# Patient Record
Sex: MALE | Race: WHITE | NOT HISPANIC OR LATINO | Employment: FULL TIME | URBAN - METROPOLITAN AREA
[De-identification: names, ages, dates, MRNs, and addresses within clinical notes are randomized per-mention and may not be internally consistent; named-entity substitution may affect disease eponyms.]

---
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
## 2021-11-05 ENCOUNTER — APPOINTMENT (OUTPATIENT)
Dept: GENERAL RADIOLOGY | Facility: HOSPITAL | Age: 21
End: 2021-11-05

## 2021-11-05 ENCOUNTER — HOSPITAL ENCOUNTER (EMERGENCY)
Facility: HOSPITAL | Age: 21
Discharge: HOME | End: 2021-11-05
Attending: EMERGENCY MEDICINE | Admitting: EMERGENCY MEDICINE

## 2021-11-05 VITALS
OXYGEN SATURATION: 98 % | SYSTOLIC BLOOD PRESSURE: 130 MMHG | HEART RATE: 65 BPM | DIASTOLIC BLOOD PRESSURE: 84 MMHG | WEIGHT: 240 LBS | BODY MASS INDEX: 31.81 KG/M2 | TEMPERATURE: 98.3 F | HEIGHT: 73 IN | RESPIRATION RATE: 16 BRPM

## 2021-11-05 DIAGNOSIS — R05.9 COUGH: Primary | ICD-10-CM

## 2021-11-05 LAB — S PYO AG THROAT QL: NEGATIVE

## 2021-11-05 PROCEDURE — 87081 CULTURE SCREEN ONLY: CPT

## 2021-11-05 PROCEDURE — 87880 STREP A ASSAY W/OPTIC: CPT

## 2021-11-05 PROCEDURE — 71045 X-RAY EXAM CHEST 1 VIEW: CPT

## 2021-11-05 PROCEDURE — 99283 EMERGENCY DEPT VISIT LOW MDM: CPT

## 2021-11-05 PROCEDURE — 87147 CULTURE TYPE IMMUNOLOGIC: CPT

## 2021-11-05 PROCEDURE — 99282 EMERGENCY DEPT VISIT SF MDM: CPT

## 2021-11-06 NOTE — ED NOTES
Patient arrives to ER with c/o cough and sore throat. Relates that he started coughing yesterday and woke up this morning with a sore throat. Relates he started coughing up yellow mucous this am. Throat appears red upon assessment.     Windy Acosta, RN  11/05/21 1416

## 2021-11-06 NOTE — ED PROVIDER NOTES
" EMERGENCY DEPARTMENT ENCOUNTER      Room Number: 12/12    History is provided by the patient, no translation services needed    HPI:    Chief complaint: Cough, sore throat    Quality/Severity: Sore throat 5/10 severity    Timing/Duration: 2 days    Modifying Factors: Has had relief with Tylenol, ibuprofen, cough drops.    Associated Symptoms: Headache.  Denies congestion, runny nose, sinus pressure, ear pain.    Narrative: Pt is a 21 y.o. male who presents complaining of a cough and sore throat that began yesterday and has progressively worsened.  Patient has tried Tylenol, ibuprofen, cough drops, which have not provided much relief.  Patient has not tried other OTC medications.  Patient has not been vaccinated against Covid. Previous 1.5 pack/day smoker.  Now uses \"a lot\" of nicotine vaping.  No known history of lung disease.  History of strep infections.    PMD: Provider, No Known    REVIEW OF SYSTEMS  Review of Systems   Constitutional: Negative for chills and fever.   HENT: Positive for sore throat. Negative for congestion, postnasal drip, rhinorrhea, sinus pressure and sinus pain.    Eyes: Negative for pain and visual disturbance.   Respiratory: Positive for cough. Negative for shortness of breath.    Cardiovascular: Negative for chest pain and palpitations.   Gastrointestinal: Negative for abdominal pain, constipation, diarrhea, nausea and vomiting.   Genitourinary: Negative for difficulty urinating, dysuria and flank pain.   Musculoskeletal: Negative for gait problem and myalgias.   Skin: Negative for rash and wound.   Neurological: Positive for headaches. Negative for dizziness, syncope and numbness.       PAST MEDICAL HISTORY  Active Ambulatory Problems     Diagnosis Date Noted   • No Active Ambulatory Problems     Resolved Ambulatory Problems     Diagnosis Date Noted   • No Resolved Ambulatory Problems     No Additional Past Medical History       PAST SURGICAL HISTORY  History reviewed. No pertinent " surgical history.    FAMILY HISTORY  History reviewed. No pertinent family history.    SOCIAL HISTORY  Social History     Socioeconomic History   • Marital status: Single   Tobacco Use   • Smoking status: Current Every Day Smoker     Packs/day: 1.00     Types: Cigarettes   Vaping Use   • Vaping Use: Every day   Substance and Sexual Activity   • Alcohol use: Not Currently       ALLERGIES  Patient has no known allergies.    No current facility-administered medications for this encounter.  No current outpatient medications on file.    PHYSICAL EXAM  ED Triage Vitals   Temp Pulse Resp BP SpO2   -- -- -- -- --      Temp src Heart Rate Source Patient Position BP Location FiO2 (%)   -- -- -- -- --       Physical Exam  Constitutional:       General: He is not in acute distress.     Appearance: He is normal weight. He is not ill-appearing.   HENT:      Head: Normocephalic and atraumatic.      Right Ear: Tympanic membrane, ear canal and external ear normal.      Left Ear: Tympanic membrane, ear canal and external ear normal.      Nose: No congestion or rhinorrhea.      Mouth/Throat:      Pharynx: Posterior oropharyngeal erythema present. No oropharyngeal exudate.      Tonsils: No tonsillar exudate or tonsillar abscesses.      Comments: Tonsils appear enlarged.  Eyes:      Conjunctiva/sclera: Conjunctivae normal.      Pupils: Pupils are equal, round, and reactive to light.   Cardiovascular:      Rate and Rhythm: Normal rate and regular rhythm.      Heart sounds: Normal heart sounds.   Pulmonary:      Effort: Pulmonary effort is normal.      Breath sounds: Normal breath sounds.   Musculoskeletal:      Cervical back: Normal range of motion.   Skin:     General: Skin is warm and dry.      Capillary Refill: Capillary refill takes less than 2 seconds.   Neurological:      Mental Status: He is alert.   Psychiatric:         Mood and Affect: Mood normal.         Behavior: Behavior normal.       LAB RESULTS  Lab Results (last 24 hours)      Procedure Component Value Units Date/Time    Rapid Strep A Screen - Swab, Throat [[id number]]  (Normal) Collected: 11/05/21 2129    Specimen: Swab from Throat Updated: 11/05/21 2151     Strep A Ag Negative    Beta Strep Culture, Throat - Swab, Throat [[id number]] Collected: 11/05/21 2129    Specimen: Swab from Throat Updated: 11/05/21 2150        I ordered the above labs and reviewed the results    RADIOLOGY  XR Chest 1 View    Result Date: 11/5/2021  CR Chest 1 Vw INDICATION: Cough, smoker COMPARISON:  None available. FINDINGS: Portable AP view(s) of the chest.  The heart and mediastinal contours are normal. The lungs are clear. No pneumothorax or pleural effusion.     No acute cardiopulmonary findings. Signer Name: Maribel Stern MD  Signed: 11/5/2021 10:10 PM  Workstation Name: [id number]  [facility]    I ordered the above radiologic testing and reviewed the results    PROCEDURES  Procedures      PROGRESS AND CONSULTS  ED Course as of 11/05/21 2236 Fri Nov 05, 2021 2200 Patient has not been vaccinated for COVID-19, but discussed with patient his symptoms are nonconcerning for infection and will forego Covid swab at this time. [AS]   2223 Discussed with patient's results of chest x-ray and rapid strep test.  Patient verbalized understanding that antibiotics would not be needed, and that symptomatic treatment should resolve his symptoms.  Suggested follow-up with PCP if symptoms do not resolve or worsen.  Discussed smoking/vaping cessation.  Updated patient that beta strep culture would not be back before discharge. [AS]      ED Course User Index  [AS] Julisa Jiménez PA-C       MEDICAL DECISION MAKING    MDM     My differential diagnosis for cough includes but is not limited to:  Upper respiratory infection, bronchitis, pneumonia, cough variant asthma, bacterial, viral or lung infections, lung cancer, covid -19     DIAGNOSIS  Final diagnoses:   Cough       Latest Documented Vital Signs:  As  of 22:36 EDT  BP- 130/84 HR- 65 Temp- 98.3 °F (36.8 °C) (Oral) O2 sat- 98%    DISPOSITION  Discharged home.    Smoking cessation discussed with patient.    Discussed pertinent findings with the patient/family.  Patient/Family voiced understanding of need to follow-up for recheck and further testing as needed.  Return to the Emergency Department warnings were given.         Medication List      No changes were made to your prescriptions during this visit.              Follow-up Information     Provider, No Known.    Why: As needed, If symptoms worsen  Contact information:  [facility] [address] [phone]                           Dictated utilizing Dragon dictation     Julisa Jiménez PA-C  11/05/21 9334

## 2021-11-06 NOTE — DISCHARGE INSTRUCTIONS
Use OTC medications to control cough and mucus, such as Mucinex, Robitussin, etc.  Use OTC Tylenol and ibuprofen as needed for headache and sore throat.

## 2021-11-07 LAB — BACTERIA SPEC AEROBE CULT: ABNORMAL

## 2022-04-25 ENCOUNTER — HOSPITAL ENCOUNTER (EMERGENCY)
Facility: HOSPITAL | Age: 22
Discharge: HOME | End: 2022-04-25
Attending: EMERGENCY MEDICINE | Admitting: EMERGENCY MEDICINE

## 2022-04-25 VITALS
HEART RATE: 85 BPM | HEIGHT: 73 IN | TEMPERATURE: 98.5 F | OXYGEN SATURATION: 97 % | BODY MASS INDEX: 32.47 KG/M2 | SYSTOLIC BLOOD PRESSURE: 128 MMHG | RESPIRATION RATE: 18 BRPM | WEIGHT: 245 LBS | DIASTOLIC BLOOD PRESSURE: 74 MMHG

## 2022-04-25 DIAGNOSIS — K52.9 ACUTE GASTROENTERITIS: Primary | ICD-10-CM

## 2022-04-25 LAB
ALBUMIN SERPL-MCNC: 4.5 G/DL (ref 3.5–5.2)
ALBUMIN/GLOB SERPL: 2.1 G/DL
ALP SERPL-CCNC: 47 U/L (ref 39–117)
ALT SERPL W P-5'-P-CCNC: 17 U/L (ref 1–41)
ANION GAP SERPL CALCULATED.3IONS-SCNC: 9.6 MMOL/L (ref 5–15)
AST SERPL-CCNC: 17 U/L (ref 1–40)
BASOPHILS # BLD AUTO: 0.04 10*3/MM3 (ref 0–0.2)
BASOPHILS NFR BLD AUTO: 0.6 % (ref 0–1.5)
BILIRUB SERPL-MCNC: 0.5 MG/DL (ref 0–1.2)
BILIRUB UR QL STRIP: NEGATIVE
BUN SERPL-MCNC: 15 MG/DL (ref 6–20)
BUN/CREAT SERPL: 15.3 (ref 7–25)
CALCIUM SPEC-SCNC: 9 MG/DL (ref 8.6–10.5)
CHLORIDE SERPL-SCNC: 103 MMOL/L (ref 98–107)
CLARITY UR: ABNORMAL
CO2 SERPL-SCNC: 23.4 MMOL/L (ref 22–29)
COLOR UR: YELLOW
CREAT SERPL-MCNC: 0.98 MG/DL (ref 0.76–1.27)
DEPRECATED RDW RBC AUTO: 38.5 FL (ref 37–54)
EGFRCR SERPLBLD CKD-EPI 2021: 112.5 ML/MIN/1.73
EOSINOPHIL # BLD AUTO: 0.12 10*3/MM3 (ref 0–0.4)
EOSINOPHIL NFR BLD AUTO: 1.7 % (ref 0.3–6.2)
ERYTHROCYTE [DISTWIDTH] IN BLOOD BY AUTOMATED COUNT: 12 % (ref 12.3–15.4)
GLOBULIN UR ELPH-MCNC: 2.1 GM/DL
GLUCOSE SERPL-MCNC: 96 MG/DL (ref 65–99)
GLUCOSE UR STRIP-MCNC: NEGATIVE MG/DL
HCT VFR BLD AUTO: 47.8 % (ref 37.5–51)
HGB BLD-MCNC: 16.1 G/DL (ref 13–17.7)
HGB UR QL STRIP.AUTO: NEGATIVE
IMM GRANULOCYTES # BLD AUTO: 0.01 10*3/MM3 (ref 0–0.05)
IMM GRANULOCYTES NFR BLD AUTO: 0.1 % (ref 0–0.5)
KETONES UR QL STRIP: NEGATIVE
LEUKOCYTE ESTERASE UR QL STRIP.AUTO: NEGATIVE
LIPASE SERPL-CCNC: 19 U/L (ref 13–60)
LYMPHOCYTES # BLD AUTO: 2.84 10*3/MM3 (ref 0.7–3.1)
LYMPHOCYTES NFR BLD AUTO: 40.6 % (ref 19.6–45.3)
MCH RBC QN AUTO: 29.5 PG (ref 26.6–33)
MCHC RBC AUTO-ENTMCNC: 33.7 G/DL (ref 31.5–35.7)
MCV RBC AUTO: 87.7 FL (ref 79–97)
MONOCYTES # BLD AUTO: 0.6 10*3/MM3 (ref 0.1–0.9)
MONOCYTES NFR BLD AUTO: 8.6 % (ref 5–12)
NEUTROPHILS NFR BLD AUTO: 3.38 10*3/MM3 (ref 1.7–7)
NEUTROPHILS NFR BLD AUTO: 48.4 % (ref 42.7–76)
NITRITE UR QL STRIP: NEGATIVE
NRBC BLD AUTO-RTO: 0 /100 WBC (ref 0–0.2)
PH UR STRIP.AUTO: 8.5 [PH] (ref 4.5–8)
PLAT MORPH BLD: NORMAL
PLATELET # BLD AUTO: 161 10*3/MM3 (ref 140–450)
PMV BLD AUTO: 11.3 FL (ref 6–12)
POTASSIUM SERPL-SCNC: 3.7 MMOL/L (ref 3.5–5.2)
PROT SERPL-MCNC: 6.6 G/DL (ref 6–8.5)
PROT UR QL STRIP: NEGATIVE
RBC # BLD AUTO: 5.45 10*6/MM3 (ref 4.14–5.8)
RBC MORPH BLD: NORMAL
SODIUM SERPL-SCNC: 136 MMOL/L (ref 136–145)
SP GR UR STRIP: 1.01 (ref 1–1.03)
UROBILINOGEN UR QL STRIP: ABNORMAL
WBC MORPH BLD: NORMAL
WBC NRBC COR # BLD: 6.99 10*3/MM3 (ref 3.4–10.8)

## 2022-04-25 PROCEDURE — 99282 EMERGENCY DEPT VISIT SF MDM: CPT | Performed by: EMERGENCY MEDICINE

## 2022-04-25 PROCEDURE — 85025 COMPLETE CBC W/AUTO DIFF WBC: CPT | Performed by: EMERGENCY MEDICINE

## 2022-04-25 PROCEDURE — 25010000002 ONDANSETRON PER 1 MG: Performed by: EMERGENCY MEDICINE

## 2022-04-25 PROCEDURE — 81003 URINALYSIS AUTO W/O SCOPE: CPT | Performed by: EMERGENCY MEDICINE

## 2022-04-25 PROCEDURE — 96375 TX/PRO/DX INJ NEW DRUG ADDON: CPT

## 2022-04-25 PROCEDURE — 99283 EMERGENCY DEPT VISIT LOW MDM: CPT

## 2022-04-25 PROCEDURE — 80053 COMPREHEN METABOLIC PANEL: CPT | Performed by: EMERGENCY MEDICINE

## 2022-04-25 PROCEDURE — 85007 BL SMEAR W/DIFF WBC COUNT: CPT | Performed by: EMERGENCY MEDICINE

## 2022-04-25 PROCEDURE — 83690 ASSAY OF LIPASE: CPT | Performed by: EMERGENCY MEDICINE

## 2022-04-25 PROCEDURE — 96374 THER/PROPH/DIAG INJ IV PUSH: CPT

## 2022-04-25 RX ORDER — ONDANSETRON 2 MG/ML
4 INJECTION INTRAMUSCULAR; INTRAVENOUS ONCE
Status: COMPLETED | OUTPATIENT
Start: 2022-04-25 | End: 2022-04-25

## 2022-04-25 RX ORDER — FAMOTIDINE 10 MG/ML
20 INJECTION, SOLUTION INTRAVENOUS ONCE
Status: COMPLETED | OUTPATIENT
Start: 2022-04-25 | End: 2022-04-25

## 2022-04-25 RX ORDER — ONDANSETRON 8 MG/1
8 TABLET, ORALLY DISINTEGRATING ORAL EVERY 8 HOURS PRN
Qty: 10 TABLET | Refills: 0 | Status: SHIPPED | OUTPATIENT
Start: 2022-04-25

## 2022-04-25 RX ADMIN — FAMOTIDINE 20 MG: 10 INJECTION INTRAVENOUS at 14:43

## 2022-04-25 RX ADMIN — ONDANSETRON 4 MG: 2 INJECTION INTRAMUSCULAR; INTRAVENOUS at 14:43

## 2022-04-25 RX ADMIN — SODIUM CHLORIDE 1000 ML: 9 INJECTION, SOLUTION INTRAVENOUS at 14:37

## 2022-04-25 NOTE — ED PROVIDER NOTES
Subjective   History of Present Illness  History of Present Illness    Chief complaint: Vomiting and diarrhea    Location: Home    Quality/Severity: Nonbloody, nonbilious multiple episodes    Timing/Onset/Duration: Started about 3 days ago    Modifying Factors: Worse with food and drink    Associated Symptoms: No headache.  No fever chills or cough.  No sore throat earache or nasal congestion.  No chest pain or shortness of breath.  No blood in the urine or emesis.  Patient complains of some epigastric abdominal pain.    Narrative: This 22-year-old white male presents with vomiting and diarrhea and epigastric abdominal pain.  She does not drink alcohol.    PCP: No known provider  Review of Systems   Constitutional: Negative for chills and fever.   HENT: Positive for congestion. Negative for sore throat.    Respiratory: Negative for shortness of breath.    Gastrointestinal: Positive for abdominal pain, diarrhea, nausea and vomiting.   Genitourinary: Negative for difficulty urinating.   Musculoskeletal: Negative for back pain.   Skin: Negative for rash.   Psychiatric/Behavioral: Negative for confusion.        Medication List      You have not been prescribed any medications.         History reviewed. No pertinent past medical history.    No Known Allergies    History reviewed. No pertinent surgical history.    History reviewed. No pertinent family history.    Social History     Socioeconomic History   • Marital status: Single   Tobacco Use   • Smoking status: Current Every Day Smoker     Packs/day: 1.00     Types: Cigarettes   • Smokeless tobacco: Never Used   Vaping Use   • Vaping Use: Every day   Substance and Sexual Activity   • Alcohol use: Not Currently   • Drug use: Never   • Sexual activity: Defer           Objective   Physical Exam  Vitals (The temperature is 98.5 °F, pulse 85, respirations 17, /81, room pulse ox 98%.) and nursing note reviewed.   Constitutional:       Appearance: Normal appearance.    HENT:      Head: Normocephalic and atraumatic.      Mouth/Throat:      Mouth: Mucous membranes are moist.   Cardiovascular:      Rate and Rhythm: Normal rate and regular rhythm.      Pulses: Normal pulses.      Heart sounds: Normal heart sounds. No murmur heard.    No friction rub. No gallop.   Pulmonary:      Effort: Pulmonary effort is normal.      Breath sounds: Normal breath sounds.   Abdominal:      General: Abdomen is flat. Bowel sounds are normal. There is no distension.      Palpations: There is no mass.      Tenderness: There is abdominal tenderness (Mild epigastric tenderness). There is no guarding or rebound.      Hernia: No hernia is present.   Musculoskeletal:         General: No swelling or tenderness. Normal range of motion.   Skin:     General: Skin is warm and dry.      Findings: No rash.   Neurological:      General: No focal deficit present.      Mental Status: He is alert and oriented to person, place, and time.         Procedures           ED Course  ED Course as of 04/25/22 1520   Mon Apr 25, 2022   1504 Urinalysis is cloudy.  The laboratory values are otherwise unremarkable. [RC]      ED Course User Index  [RC] Alverto Billings MD      15:21 EDT, 04/25/22:  The patient was reassessed.  He feels better.  His vital signs were reviewed and are stable.  Abdominal exam: Soft nontender no masses positive bowel sounds.  The patient tolerated clear liquids.    15:21 EDT, 04/25/22:  The patient's diagnosis of acute gastroenteritis was discussed with him.  The patient should drink clear liquids for 24 to 48 hours, then advance diet as tolerated.  The patient should call the patient connection line for a primary care provider to follow-up within 2 days.  The patient should return to the emergency department if there is vomiting not controlled with Zofran, vomiting blood, bloody diarrhea, worsening pain, worse in any way at all.  All the patient question were answered he will be discharged in good  condition.                                           [facility]    Final diagnoses:   Acute gastroenteritis       ED Disposition  ED Disposition     None          No follow-up provider specified.       Medication List      No changes were made to your prescriptions during this visit.          Alverto Billings MD  04/25/22 [id number]

## 2022-04-25 NOTE — DISCHARGE INSTRUCTIONS
Clear liquids for 24 to 48 hours, then advance as tolerated.  Call the patient connection line in the morning for primary care provider to follow-up within 2 days.  During the emergency department there is vomiting not controlled with Zofran, vomiting blood, bloody diarrhea, worsening pain, worse in any way at all.

## 2022-08-24 ENCOUNTER — HOSPITAL ENCOUNTER (EMERGENCY)
Facility: HOSPITAL | Age: 22
Discharge: HOME | End: 2022-08-24
Attending: EMERGENCY MEDICINE | Admitting: EMERGENCY MEDICINE

## 2022-08-24 VITALS
RESPIRATION RATE: 16 BRPM | DIASTOLIC BLOOD PRESSURE: 93 MMHG | SYSTOLIC BLOOD PRESSURE: 155 MMHG | HEIGHT: 73 IN | BODY MASS INDEX: 32.47 KG/M2 | TEMPERATURE: 98.7 F | WEIGHT: 245 LBS | HEART RATE: 90 BPM | OXYGEN SATURATION: 98 %

## 2022-08-24 DIAGNOSIS — J02.9 SORE THROAT: Primary | ICD-10-CM

## 2022-08-24 LAB — S PYO AG THROAT QL: NEGATIVE

## 2022-08-24 PROCEDURE — 99283 EMERGENCY DEPT VISIT LOW MDM: CPT

## 2022-08-24 PROCEDURE — 87081 CULTURE SCREEN ONLY: CPT

## 2022-08-24 PROCEDURE — 87880 STREP A ASSAY W/OPTIC: CPT

## 2022-08-24 RX ORDER — ACETAMINOPHEN 500 MG
500 TABLET ORAL EVERY 6 HOURS PRN
Qty: 20 TABLET | Refills: 0 | Status: SHIPPED | OUTPATIENT
Start: 2022-08-24 | End: 2022-08-29

## 2022-08-24 RX ORDER — SODIUM CHLORIDE 0.9 % (FLUSH) 0.9 %
10 SYRINGE (ML) INJECTION AS NEEDED
Status: DISCONTINUED | OUTPATIENT
Start: 2022-08-24 | End: 2022-08-24

## 2022-08-24 NOTE — DISCHARGE INSTRUCTIONS
Medication as directed.  Plenty of fluids.  Tylenol or ibuprofen as needed for fever pain.  Cepacol or Sucrets throat lozenges.  Follow-up with your PCP as above.  Return to ED for worsening symptoms, medical emergencies.

## 2022-08-24 NOTE — ED PROVIDER NOTES
" EMERGENCY DEPARTMENT ENCOUNTER      Room Number: 11/11    History is provided by the patient, no translation services needed    HPI:    Chief complaint: Sore throat    Location: Oropharynx    Quality/Severity: Patient describes the pain as an \"irritation\".  He rates the severity of 5 out of 10.    Timing/Duration: 3 days    Modifying Factors: None    Associated Symptoms: Nausea, vomiting, diarrhea    Narrative: Pt is a 22 y.o. male who presents complaining of a sore throat x3 days.  Patient advises that the pain feels like an irritation that he rates a 5 out of 10.  He admits to associated nausea, vomiting, and diarrhea.  Patient admits to 1 episode of vomiting per day in the mornings for the past 3 days.  He also admits to approximately 3 episodes of diarrhea per day.  Patient initially thought that his diarrhea was secondary to Mexican food that he ate.  He advises that a relative of his is a nurse and thinks that he may have strep throat so he came to the ER to be evaluated.  He denies any chest pain, shortness of breath, cough, abdominal pain, dysuria, hematuria, headaches, rhinorrhea, or congestion.      PMD: Provider, No Known    REVIEW OF SYSTEMS  Review of Systems   Constitutional: Negative for chills and fever.   HENT: Positive for sore throat. Negative for congestion and rhinorrhea.    Eyes: Negative for visual disturbance.   Respiratory: Negative for cough, chest tightness and shortness of breath.    Cardiovascular: Negative for chest pain, palpitations and leg swelling.   Gastrointestinal: Positive for diarrhea, nausea and vomiting. Negative for abdominal pain, blood in stool and constipation.   Genitourinary: Negative for difficulty urinating, dysuria, flank pain and hematuria.   Musculoskeletal: Negative for gait problem.   Skin: Negative for color change, pallor, rash and wound.   Neurological: Negative for dizziness, syncope, weakness, numbness and headaches.   Psychiatric/Behavioral: Negative for " confusion. The patient is not nervous/anxious.          PAST MEDICAL HISTORY  Active Ambulatory Problems     Diagnosis Date Noted   • No Active Ambulatory Problems     Resolved Ambulatory Problems     Diagnosis Date Noted   • No Resolved Ambulatory Problems     No Additional Past Medical History       PAST SURGICAL HISTORY  History reviewed. No pertinent surgical history.    FAMILY HISTORY  History reviewed. No pertinent family history.    SOCIAL HISTORY  Social History     Socioeconomic History   • Marital status: Single   Tobacco Use   • Smoking status: Current Every Day Smoker     Packs/day: 1.00     Types: Cigarettes   • Smokeless tobacco: Never Used   Vaping Use   • Vaping Use: Every day   Substance and Sexual Activity   • Alcohol use: Not Currently   • Drug use: Never   • Sexual activity: Defer       ALLERGIES  Patient has no known allergies.    No current facility-administered medications for this encounter.    Current Outpatient Medications:   •  acetaminophen (TYLENOL) 500 MG tablet, Take 1 tablet by mouth Every 6 (Six) Hours As Needed for Mild Pain , Moderate Pain , Headache or Fever for up to 5 days., Disp: 20 tablet, Rfl: 0  •  ondansetron ODT (Zofran ODT) 8 MG disintegrating tablet, Place 1 tablet on the tongue Every 8 (Eight) Hours As Needed for Nausea or Vomiting., Disp: 10 tablet, Rfl: 0    PHYSICAL EXAM  ED Triage Vitals [08/24/22 1248]   Temp Heart Rate Resp BP SpO2   98.7 °F (37.1 °C) 98 16 149/97 98 %      Temp src Heart Rate Source Patient Position BP Location FiO2 (%)   Oral Monitor -- -- --       Physical Exam  Vitals and nursing note reviewed.   Constitutional:       General: He is not in acute distress.     Appearance: He is well-developed. He is not ill-appearing, toxic-appearing or diaphoretic.   HENT:      Head: Normocephalic and atraumatic.      Nose: No congestion or rhinorrhea.      Mouth/Throat:      Mouth: Mucous membranes are moist. No oral lesions.      Pharynx: Uvula midline.  Posterior oropharyngeal erythema present. No pharyngeal swelling, oropharyngeal exudate or uvula swelling.      Tonsils: No tonsillar exudate or tonsillar abscesses.   Eyes:      Extraocular Movements:      Right eye: Normal extraocular motion.      Left eye: Normal extraocular motion.      Conjunctiva/sclera: Conjunctivae normal.      Pupils: Pupils are equal, round, and reactive to light.   Cardiovascular:      Rate and Rhythm: Normal rate and regular rhythm.      Heart sounds: Normal heart sounds.   Pulmonary:      Effort: Pulmonary effort is normal. No respiratory distress.      Breath sounds: Normal breath sounds. No stridor. No wheezing, rhonchi or rales.   Chest:      Chest wall: No tenderness.   Abdominal:      General: Bowel sounds are normal. There is no distension.      Palpations: Abdomen is soft. There is no mass.      Tenderness: There is no abdominal tenderness. There is no guarding or rebound.   Musculoskeletal:         General: Normal range of motion.      Cervical back: Normal range of motion and neck supple.   Skin:     General: Skin is warm and dry.      Coloration: Skin is not pale.      Findings: No erythema or rash.   Neurological:      Mental Status: He is alert and oriented to person, place, and time.   Psychiatric:         Mood and Affect: Mood and affect normal.           LAB RESULTS  Lab Results (last 24 hours)     Procedure Component Value Units Date/Time    Rapid Strep A Screen - Swab, Throat [[id number]]  (Normal) Collected: 08/24/22 1316    Specimen: Swab from Throat Updated: 08/24/22 1329     Strep A Ag Negative    Beta Strep Culture, Throat - Swab, Throat [[id number]] Collected: 08/24/22 1316    Specimen: Swab from Throat Updated: 08/24/22 1328            I ordered the above labs and reviewed the results    RADIOLOGY  No Radiology Exams Resulted Within Past 24 Hours        PROCEDURES  Procedures      PROGRESS AND CONSULTS  ED Course as of 08/24/22 1336   Wed Aug 24, 2022   1317 The  "patient refuses a COVID-19/flu swab as well as blood work and a urinalysis.  Patient advises that he does not believe in \"COVID-19 stuff\" and does not want to be tested at this time.  He advises that he does not have a primary care physician so he arrived to the ED today to be tested for strep.  The patient is only consenting to a strep swab at this time. []   1329 The patient was advised that his rapid strep screen was negative.  He states that he is still refusing the rest of the work-up. [AH]      ED Course User Index  [AH] Karlie Gordon PA-C           MEDICAL DECISION MAKING    MDM     My differential diagnosis for sore throat includes but is not limited to viral pharyngitis, strep pharyngitis, mononucleosis, epiglottitis, esophageal abrasion, peritonsillar abscess, retropharyngeal abscess, tonsillitis, scarlet fever, viral syndrome, COVID-19 and herpetic gingival stomatitis  DIAGNOSIS  Final diagnoses:   Sore throat       Latest Documented Vital Signs:  As of 13:36 EDT  BP- 155/93 HR- 90 Temp- 98.7 °F (37.1 °C) (Oral) O2 sat- 98%    DISPOSITION  Pt discharged    Discussed pertinent findings with the patient/family.  Patient/Family voiced understanding of need to follow-up for recheck and further testing as needed.  Return to the Emergency Department warnings were given.         Medication List      New Prescriptions    acetaminophen 500 MG tablet  Commonly known as: TYLENOL  Take 1 tablet by mouth Every 6 (Six) Hours As Needed for Mild Pain , Moderate Pain , Headache or Fever for up to 5 days.           Where to Get Your Medications      These medications were sent to [id number] - [place], [place] - [address] [facility] [address] - [phone]  - [phone]   [address], [place] [address]    Phone: [phone]   · acetaminophen 500 MG tablet              Follow-up Information     Provider, No Known. Call today.    Why: to schedule follow up  Contact " information:  [facility] [address]  [phone]             Go to  [facility] Emergency Department.    Specialty: Emergency Medicine  Why: If symptoms worsen  Contact information:  [address] [facility] [address] [phone]                         Dictated utilizing Alfredo dictation     Karlie Gordon PA-C  08/24/22 7695

## 2022-08-24 NOTE — ED NOTES
Pt refusing all testing at this time except a strep swab. Pt speaking with LALA Ziegler. Pt stated that last time he was here he had to be stuck 7 times and it makes him scared. Pt stated that all he wants is a strep swab.

## 2022-08-24 NOTE — ED NOTES
Pt reports sore throat with N/V/D for a couple days. Pt stated that he has had strep throat multiple times in the past. Pt stated that he has an episode of vomiting in the morning the last two days but denies abd pain.

## 2022-08-26 LAB — BACTERIA SPEC AEROBE CULT: NORMAL

## 2022-11-06 ENCOUNTER — APPOINTMENT (OUTPATIENT)
Dept: GENERAL RADIOLOGY | Facility: HOSPITAL | Age: 22
End: 2022-11-06

## 2022-11-06 ENCOUNTER — HOSPITAL ENCOUNTER (EMERGENCY)
Facility: HOSPITAL | Age: 22
Discharge: HOME | End: 2022-11-06
Attending: EMERGENCY MEDICINE | Admitting: EMERGENCY MEDICINE

## 2022-11-06 VITALS
BODY MASS INDEX: 31.81 KG/M2 | SYSTOLIC BLOOD PRESSURE: 120 MMHG | HEIGHT: 73 IN | TEMPERATURE: 98 F | RESPIRATION RATE: 16 BRPM | OXYGEN SATURATION: 99 % | DIASTOLIC BLOOD PRESSURE: 85 MMHG | WEIGHT: 240 LBS | HEART RATE: 111 BPM

## 2022-11-06 DIAGNOSIS — M79.671 RIGHT FOOT PAIN: Primary | ICD-10-CM

## 2022-11-06 PROCEDURE — 73630 X-RAY EXAM OF FOOT: CPT

## 2022-11-06 PROCEDURE — 99283 EMERGENCY DEPT VISIT LOW MDM: CPT

## 2022-11-06 RX ORDER — KETOROLAC TROMETHAMINE 10 MG/1
20 TABLET, FILM COATED ORAL ONCE
Status: COMPLETED | OUTPATIENT
Start: 2022-11-06 | End: 2022-11-06

## 2022-11-06 RX ORDER — DICLOFENAC SODIUM 75 MG/1
75 TABLET, DELAYED RELEASE ORAL 2 TIMES DAILY
Qty: 20 TABLET | Refills: 0 | Status: SHIPPED | OUTPATIENT
Start: 2022-11-06 | End: 2022-11-16

## 2022-11-06 RX ADMIN — KETOROLAC TROMETHAMINE 20 MG: 10 TABLET, FILM COATED ORAL at 18:50

## 2022-11-06 NOTE — ED PROVIDER NOTES
"Subjective     History provided by:  Patient    History of Present Illness    · Chief complaint: Foot pain    · Location: Plantar surface of the right mid and distal foot and the dorsum of the right foot.    · Quality/Severity: Pain is moderately severe.    · Timing/Onset: He woke at 6: 45 this morning with the pain.    · Modifying Factors: Weightbearing exacerbates the pain.    · Associated symptoms: No swelling or numbness.    · Narrative: The patient is a 22-year-old white male who awoke this morning with right foot pain.  He denies any history of trauma.  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  He had no pain when he went to bed last night.    Review of Systems   Constitutional: Negative for activity change, appetite change, chills and fever.   HENT: Negative for congestion, ear pain, rhinorrhea, sinus pain and sore throat.    Respiratory: Negative for cough and shortness of breath.    Cardiovascular: Negative for chest pain.   Gastrointestinal: Negative for abdominal pain, diarrhea, nausea and vomiting.   Genitourinary: Negative for difficulty urinating and dysuria.   Musculoskeletal: Positive for gait problem ( Due to right foot pain). Negative for arthralgias, back pain, joint swelling, neck pain and neck stiffness.   Neurological: Negative for dizziness, weakness, numbness and headaches.     History reviewed. No pertinent past medical history.  /85   Pulse 111   Temp 98 °F (36.7 °C)   Resp 16   Ht 185.4 cm (73\")   Wt 109 kg (240 lb)   SpO2 99%   BMI 31.66 kg/m²     History reviewed. No pertinent past medical history.  Labs Reviewed - No data to display    No Known Allergies    History reviewed. No pertinent surgical history.    History reviewed. No pertinent family history.    Social History     Socioeconomic History   • Marital status: Single   Tobacco Use   • Smoking status: Every Day     Packs/day: 1.00     Types: Cigarettes   • " Smokeless tobacco: Never   Vaping Use   • Vaping Use: Every day   Substance and Sexual Activity   • Alcohol use: Not Currently   • Drug use: Yes     Types: Methamphetamines   • Sexual activity: Defer           Objective   Physical Exam  Vitals and nursing note reviewed.   Constitutional:       General: He is not in acute distress.     Appearance: Normal appearance. He is normal weight. He is not ill-appearing, toxic-appearing or diaphoretic.      Comments: The patient appears healthy in no acute distress.  Review of his vital signs: He is tachycardic with a heart rate of 111, remainder vital signs within normal limits.   HENT:      Head: Normocephalic and atraumatic.   Eyes:      General: No scleral icterus.     Conjunctiva/sclera: Conjunctivae normal.   Musculoskeletal:      Comments: The patient's right foot has soft tissue tenderness in the mid and distal plantar surface as well as on the dorsum over the second metatarsal area.  There is no bony deformity of the foot.  No swelling of the foot.  The foot is neurovascular intact.  The right ankle is nontender without swelling and no ligamental laxity.   Skin:     General: Skin is warm and dry.      Capillary Refill: Capillary refill takes less than 2 seconds.      Coloration: Skin is not jaundiced or pale.      Findings: No bruising or erythema.   Neurological:      General: No focal deficit present.      Mental Status: He is alert and oriented to person, place, and time.      Cranial Nerves: No cranial nerve deficit.      Sensory: No sensory deficit.      Motor: No weakness.   Psychiatric:         Mood and Affect: Mood normal.         Behavior: Behavior normal.         Thought Content: Thought content normal.         Judgment: Judgment normal.         Procedures           ED Course  ED Course as of 11/06/22 1844   Sun Nov 06, 2022   1838 The patient's right foot x-ray was normal to mine and the radiologist interpretation. [TP]   1839 The etiology of the patient's  right foot pain is unclear.  I will place him on Voltaren and instruct him no weightbearing for the next few days.  He will be fitted for crutches by the tech.  He will be referred to follow-up with Dr. Moura podiatry. [TP]      ED Course User Index  [TP] Villa Williamson MD                                           MDM  Number of Diagnoses or Management Options  Right foot pain: new and requires workup     Amount and/or Complexity of Data Reviewed  Tests in the radiology section of CPT®: ordered and reviewed    Risk of Complications, Morbidity, and/or Mortality  Presenting problems: moderate  Diagnostic procedures: moderate  Management options: moderate    Patient Progress  Patient progress: stable      Final diagnoses:   Right foot pain       ED Disposition  ED Disposition     ED Disposition   Discharge    Condition   Stable    Comment   --             Mustapha Moura, COLLIN  [address] [facility] [address]  [place] [address] [phone]    Schedule an appointment as soon as possible for a visit   next available         Medication List      New Prescriptions    diclofenac 75 MG EC tablet  Commonly known as: VOLTAREN  Take 1 tablet by mouth 2 (Two) Times a Day for 20 doses.           Where to Get Your Medications      These medications were sent to [id number] - [place], [place] - [address] [facility] [address] - [phone] [place] - [phone] [place] [address], [place] [facility] [address]    Phone: [phone]   · diclofenac 75 MG EC tablet         Labs Reviewed - No data to display  XR Foot 3+ View Right   Final Result   Negative right foot.      Signer Name: CATINA Jiménez MD    Signed: 11/6/2022 6:18 PM    Workstation Name: [email or web address]     [facility]             Medication List      New Prescriptions    diclofenac 75 MG EC tablet  Commonly known as: VOLTAREN  Take 1 tablet by mouth 2 (Two) Times a Day for 20 doses.           Where to Get Your  Medications      These medications were sent to [id number] - [place], [place] - [address] AT [facility] [address] - [phone]  - [phone]   [address], [place] [address]    Phone: [phone]   · diclofenac 75 MG EC tablet              Villa Williamson MD  11/06/22 0237

## 2022-11-06 NOTE — DISCHARGE INSTRUCTIONS
Keep your foot elevated is much as possible and apply ice for the next 2 days.  No weightbearing until pain-free, use crutches.

## 2022-11-08 ENCOUNTER — LAB (OUTPATIENT)
Dept: LAB | Facility: HOSPITAL | Age: 22
End: 2022-11-08

## 2022-11-08 ENCOUNTER — TRANSCRIBE ORDERS (OUTPATIENT)
Dept: ADMINISTRATIVE | Facility: HOSPITAL | Age: 22
End: 2022-11-08

## 2022-11-08 DIAGNOSIS — M10.071 ACUTE IDIOPATHIC GOUT OF RIGHT FOOT: Primary | ICD-10-CM

## 2022-11-08 DIAGNOSIS — M10.071 ACUTE IDIOPATHIC GOUT OF RIGHT FOOT: ICD-10-CM

## 2022-11-08 LAB
BASOPHILS # BLD AUTO: 0.06 10*3/MM3 (ref 0–0.2)
BASOPHILS NFR BLD AUTO: 0.7 % (ref 0–1.5)
DEPRECATED RDW RBC AUTO: 38.1 FL (ref 37–54)
EOSINOPHIL # BLD AUTO: 0.16 10*3/MM3 (ref 0–0.4)
EOSINOPHIL NFR BLD AUTO: 1.8 % (ref 0.3–6.2)
ERYTHROCYTE [DISTWIDTH] IN BLOOD BY AUTOMATED COUNT: 12 % (ref 12.3–15.4)
HCT VFR BLD AUTO: 47.3 % (ref 37.5–51)
HGB BLD-MCNC: 16.1 G/DL (ref 13–17.7)
IMM GRANULOCYTES # BLD AUTO: 0.03 10*3/MM3 (ref 0–0.05)
IMM GRANULOCYTES NFR BLD AUTO: 0.3 % (ref 0–0.5)
LYMPHOCYTES # BLD AUTO: 2.1 10*3/MM3 (ref 0.7–3.1)
LYMPHOCYTES NFR BLD AUTO: 23.3 % (ref 19.6–45.3)
MCH RBC QN AUTO: 29.5 PG (ref 26.6–33)
MCHC RBC AUTO-ENTMCNC: 34 G/DL (ref 31.5–35.7)
MCV RBC AUTO: 86.8 FL (ref 79–97)
MONOCYTES # BLD AUTO: 0.81 10*3/MM3 (ref 0.1–0.9)
MONOCYTES NFR BLD AUTO: 9 % (ref 5–12)
NEUTROPHILS NFR BLD AUTO: 5.87 10*3/MM3 (ref 1.7–7)
NEUTROPHILS NFR BLD AUTO: 64.9 % (ref 42.7–76)
NRBC BLD AUTO-RTO: 0 /100 WBC (ref 0–0.2)
PLATELET # BLD AUTO: 272 10*3/MM3 (ref 140–450)
PMV BLD AUTO: 10.7 FL (ref 6–12)
RBC # BLD AUTO: 5.45 10*6/MM3 (ref 4.14–5.8)
URATE SERPL-MCNC: 9.3 MG/DL (ref 3.4–7)
WBC NRBC COR # BLD: 9.03 10*3/MM3 (ref 3.4–10.8)

## 2022-11-08 PROCEDURE — 84550 ASSAY OF BLOOD/URIC ACID: CPT

## 2022-11-08 PROCEDURE — 36415 COLL VENOUS BLD VENIPUNCTURE: CPT

## 2022-11-08 PROCEDURE — 85025 COMPLETE CBC W/AUTO DIFF WBC: CPT

## 2023-12-28 ENCOUNTER — APPOINTMENT (OUTPATIENT)
Dept: CT IMAGING | Facility: HOSPITAL | Age: 23
End: 2023-12-28

## 2023-12-28 ENCOUNTER — HOSPITAL ENCOUNTER (EMERGENCY)
Facility: HOSPITAL | Age: 23
Discharge: HOME | End: 2023-12-28
Attending: EMERGENCY MEDICINE

## 2023-12-28 VITALS
SYSTOLIC BLOOD PRESSURE: 142 MMHG | TEMPERATURE: 98.2 F | DIASTOLIC BLOOD PRESSURE: 84 MMHG | HEART RATE: 54 BPM | WEIGHT: 226 LBS | RESPIRATION RATE: 18 BRPM | BODY MASS INDEX: 30.61 KG/M2 | HEIGHT: 72 IN | OXYGEN SATURATION: 95 %

## 2023-12-28 DIAGNOSIS — N20.1 RIGHT URETERAL STONE: Primary | ICD-10-CM

## 2023-12-28 LAB
ALBUMIN SERPL-MCNC: 4.9 G/DL (ref 3.5–5.2)
ALBUMIN/GLOB SERPL: 2 G/DL
ALP SERPL-CCNC: 53 U/L (ref 39–117)
ALT SERPL W P-5'-P-CCNC: 25 U/L (ref 1–41)
ANION GAP SERPL CALCULATED.3IONS-SCNC: 10.4 MMOL/L (ref 5–15)
AST SERPL-CCNC: 17 U/L (ref 1–40)
BACTERIA UR QL AUTO: ABNORMAL /HPF
BASOPHILS # BLD AUTO: 0.04 10*3/MM3 (ref 0–0.2)
BASOPHILS NFR BLD AUTO: 0.2 % (ref 0–1.5)
BILIRUB SERPL-MCNC: 0.6 MG/DL (ref 0–1.2)
BILIRUB UR QL STRIP: NEGATIVE
BUN SERPL-MCNC: 13 MG/DL (ref 6–20)
BUN/CREAT SERPL: 11.5 (ref 7–25)
CALCIUM SPEC-SCNC: 9.5 MG/DL (ref 8.6–10.5)
CHLORIDE SERPL-SCNC: 100 MMOL/L (ref 98–107)
CLARITY UR: ABNORMAL
CO2 SERPL-SCNC: 24.6 MMOL/L (ref 22–29)
COLOR UR: YELLOW
CREAT SERPL-MCNC: 1.13 MG/DL (ref 0.76–1.27)
DEPRECATED RDW RBC AUTO: 37.5 FL (ref 37–54)
EGFRCR SERPLBLD CKD-EPI 2021: 94.2 ML/MIN/1.73
EOSINOPHIL # BLD AUTO: 0.05 10*3/MM3 (ref 0–0.4)
EOSINOPHIL NFR BLD AUTO: 0.3 % (ref 0.3–6.2)
ERYTHROCYTE [DISTWIDTH] IN BLOOD BY AUTOMATED COUNT: 11.6 % (ref 12.3–15.4)
GLOBULIN UR ELPH-MCNC: 2.4 GM/DL
GLUCOSE SERPL-MCNC: 148 MG/DL (ref 65–99)
GLUCOSE UR STRIP-MCNC: NEGATIVE MG/DL
HCT VFR BLD AUTO: 45.2 % (ref 37.5–51)
HGB BLD-MCNC: 15.4 G/DL (ref 13–17.7)
HGB UR QL STRIP.AUTO: ABNORMAL
HYALINE CASTS UR QL AUTO: ABNORMAL /LPF
IMM GRANULOCYTES # BLD AUTO: 0.04 10*3/MM3 (ref 0–0.05)
IMM GRANULOCYTES NFR BLD AUTO: 0.2 % (ref 0–0.5)
KETONES UR QL STRIP: NEGATIVE
LEUKOCYTE ESTERASE UR QL STRIP.AUTO: NEGATIVE
LYMPHOCYTES # BLD AUTO: 1.87 10*3/MM3 (ref 0.7–3.1)
LYMPHOCYTES NFR BLD AUTO: 9.5 % (ref 19.6–45.3)
MCH RBC QN AUTO: 29.8 PG (ref 26.6–33)
MCHC RBC AUTO-ENTMCNC: 34.1 G/DL (ref 31.5–35.7)
MCV RBC AUTO: 87.4 FL (ref 79–97)
MONOCYTES # BLD AUTO: 1.25 10*3/MM3 (ref 0.1–0.9)
MONOCYTES NFR BLD AUTO: 6.3 % (ref 5–12)
MUCOUS THREADS URNS QL MICRO: ABNORMAL /HPF
NEUTROPHILS NFR BLD AUTO: 16.47 10*3/MM3 (ref 1.7–7)
NEUTROPHILS NFR BLD AUTO: 83.5 % (ref 42.7–76)
NITRITE UR QL STRIP: NEGATIVE
PH UR STRIP.AUTO: 5.5 [PH] (ref 4.5–8)
PLATELET # BLD AUTO: 307 10*3/MM3 (ref 140–450)
PMV BLD AUTO: 10.1 FL (ref 6–12)
POTASSIUM SERPL-SCNC: 3.8 MMOL/L (ref 3.5–5.2)
PROT SERPL-MCNC: 7.3 G/DL (ref 6–8.5)
PROT UR QL STRIP: ABNORMAL
RBC # BLD AUTO: 5.17 10*6/MM3 (ref 4.14–5.8)
RBC # UR STRIP: ABNORMAL /HPF
REF LAB TEST METHOD: ABNORMAL
SODIUM SERPL-SCNC: 135 MMOL/L (ref 136–145)
SP GR UR STRIP: 1.04 (ref 1–1.03)
SQUAMOUS #/AREA URNS HPF: ABNORMAL /HPF
UNIDENT CRYS URNS QL MICRO: ABNORMAL /HPF
UROBILINOGEN UR QL STRIP: ABNORMAL
WBC # UR STRIP: ABNORMAL /HPF
WBC NRBC COR # BLD AUTO: 19.72 10*3/MM3 (ref 3.4–10.8)

## 2023-12-28 PROCEDURE — 25010000002 ONDANSETRON PER 1 MG: Performed by: EMERGENCY MEDICINE

## 2023-12-28 PROCEDURE — 99284 EMERGENCY DEPT VISIT MOD MDM: CPT

## 2023-12-28 PROCEDURE — 81001 URINALYSIS AUTO W/SCOPE: CPT | Performed by: EMERGENCY MEDICINE

## 2023-12-28 PROCEDURE — 96374 THER/PROPH/DIAG INJ IV PUSH: CPT

## 2023-12-28 PROCEDURE — 80053 COMPREHEN METABOLIC PANEL: CPT | Performed by: EMERGENCY MEDICINE

## 2023-12-28 PROCEDURE — 25010000002 KETOROLAC TROMETHAMINE PER 15 MG: Performed by: EMERGENCY MEDICINE

## 2023-12-28 PROCEDURE — 74176 CT ABD & PELVIS W/O CONTRAST: CPT

## 2023-12-28 PROCEDURE — 96375 TX/PRO/DX INJ NEW DRUG ADDON: CPT

## 2023-12-28 PROCEDURE — 25010000002 FENTANYL CITRATE (PF) 50 MCG/ML SOLUTION: Performed by: EMERGENCY MEDICINE

## 2023-12-28 PROCEDURE — 25010000002 HYDROMORPHONE 1 MG/ML SOLUTION: Performed by: EMERGENCY MEDICINE

## 2023-12-28 PROCEDURE — 85025 COMPLETE CBC W/AUTO DIFF WBC: CPT | Performed by: EMERGENCY MEDICINE

## 2023-12-28 RX ORDER — KETOROLAC TROMETHAMINE 30 MG/ML
15 INJECTION, SOLUTION INTRAMUSCULAR; INTRAVENOUS ONCE
Status: COMPLETED | OUTPATIENT
Start: 2023-12-28 | End: 2023-12-28

## 2023-12-28 RX ORDER — TAMSULOSIN HYDROCHLORIDE 0.4 MG/1
1 CAPSULE ORAL DAILY
Qty: 30 CAPSULE | Refills: 0 | Status: SHIPPED | OUTPATIENT
Start: 2023-12-28

## 2023-12-28 RX ORDER — FENTANYL CITRATE 50 UG/ML
50 INJECTION, SOLUTION INTRAMUSCULAR; INTRAVENOUS ONCE
Status: COMPLETED | OUTPATIENT
Start: 2023-12-28 | End: 2023-12-28

## 2023-12-28 RX ORDER — ONDANSETRON 2 MG/ML
4 INJECTION INTRAMUSCULAR; INTRAVENOUS ONCE
Status: COMPLETED | OUTPATIENT
Start: 2023-12-28 | End: 2023-12-28

## 2023-12-28 RX ORDER — ONDANSETRON HYDROCHLORIDE 8 MG/1
8 TABLET, FILM COATED ORAL EVERY 8 HOURS PRN
Qty: 10 TABLET | Refills: 0 | Status: SHIPPED | OUTPATIENT
Start: 2023-12-28

## 2023-12-28 RX ADMIN — FENTANYL CITRATE 50 MCG: 50 INJECTION, SOLUTION INTRAMUSCULAR; INTRAVENOUS at 18:38

## 2023-12-28 RX ADMIN — KETOROLAC TROMETHAMINE 15 MG: 30 INJECTION, SOLUTION INTRAMUSCULAR; INTRAVENOUS at 17:54

## 2023-12-28 RX ADMIN — HYDROMORPHONE HYDROCHLORIDE 1 MG: 1 INJECTION, SOLUTION INTRAMUSCULAR; INTRAVENOUS; SUBCUTANEOUS at 19:23

## 2023-12-28 RX ADMIN — ONDANSETRON 4 MG: 2 INJECTION INTRAMUSCULAR; INTRAVENOUS at 17:54

## 2023-12-28 RX ADMIN — FENTANYL CITRATE 50 MCG: 50 INJECTION, SOLUTION INTRAMUSCULAR; INTRAVENOUS at 18:18

## 2023-12-28 NOTE — ED NOTES
Pt states unable to sit on gurney. Pacing in the room. Moaning. States no relief with toradol given. Dr. Billings aware.

## 2023-12-28 NOTE — ED PROVIDER NOTES
"Subjective   History of Present Illness    Chief complaint: Flank pain    Location: Right flank into the right lower quadrant    Quality/Severity: There, sharp    Timing/Onset/Duration: Acute onset 2 hours ago    Modifying Factors: Nothing makes it better    Associated Symptoms: No headache.  No fever chills or cough.  No sore throat earache or nasal congestion.  No chest pain or shortness of breath.  Patient's had nausea and vomiting.  Nonbloody, nonbilious emesis.  No diarrhea or burning on urination.    Narrative: This 23-year-old presents with sudden onset of right lower quadrant pain and right flank pain 2 hours ago.  Has had nausea and vomiting x 3.  Patient is unable to void despite the urge to void.  Patient denies fever.  Patient states he \"feels like the last time I had a kidney stone\".    PCP:    Review of Systems   Constitutional:  Negative for chills and fever.   HENT:  Negative for congestion, ear pain and sore throat.    Respiratory:  Negative for cough.    Cardiovascular:  Negative for chest pain.   Gastrointestinal:  Positive for abdominal pain, nausea and vomiting. Negative for diarrhea.   Genitourinary:  Positive for flank pain.   Musculoskeletal:  Negative for back pain.   Skin:  Negative for rash.   Neurological:  Negative for headaches.         No past medical history on file.    No Known Allergies    No past surgical history on file.    No family history on file.    Social History     Socioeconomic History    Marital status: Single   Tobacco Use    Smoking status: Every Day     Packs/day: 1     Types: Cigarettes    Smokeless tobacco: Never   Vaping Use    Vaping Use: Every day   Substance and Sexual Activity    Alcohol use: Not Currently    Drug use: Yes     Types: Methamphetamines    Sexual activity: Defer           Objective   Physical Exam  Vitals (Temperature is 98.3 °F, pulse 79, saturation is 99% on room air, the blood pressure is, the respirations are,) and nursing note reviewed. "   Constitutional:       Appearance: He is well-developed.      Comments: Patient is writhing on the cart and cannot get comfortable   HENT:      Head: Normocephalic and atraumatic.      Mouth/Throat:      Mouth: Mucous membranes are moist.   Cardiovascular:      Rate and Rhythm: Normal rate and regular rhythm.      Heart sounds: Normal heart sounds. No murmur heard.     No friction rub. No gallop.   Pulmonary:      Effort: Pulmonary effort is normal.      Breath sounds: Normal breath sounds.   Abdominal:      General: Abdomen is flat. Bowel sounds are normal.      Palpations: Abdomen is soft.      Tenderness: There is abdominal tenderness (Moderate right lower quadrant). There is right CVA tenderness.      Hernia: No hernia is present.   Skin:     General: Skin is warm and dry.      Findings: No rash.   Neurological:      General: No focal deficit present.      Mental Status: He is alert and oriented to person, place, and time.         Procedures           ED Course  ED Course as of 12/28/23 1918   Thu Dec 28, 2023   1757 Urinalysis shows specific gravity 1.038, blood moderate, protein 100 mg/dL, nitrite negative, leukocytes negative.  The urine microscopic shows no RBCs, no WBCs, no bacteria, 0-2 squamous epithelial cells. [RC]   1757 The white blood cell count is 19.7, neutrophil percent 83%.  The absolute neutrophil count is 16.4. [RC]   1826 The serum glucose is 148, sodium 135, CMP is otherwise unremarkable [RC]      ED Course User Index  [RC] Alverto Billings MD      19:42 EST, 12/28/23: The patient was reassessed.  His pain is controlled.  Vital signs reviewed and are stable.  Abdominal exam: Soft, nontender, no masses, positive bowel sounds.    19:42 EST, 12/28/23:  The patient's diagnosis of  Right ureteral stone was discussed with him.  The patient will be given a prescription for Norco, Zofran, and Flomax.  The patient should take Colace as needed as directed for constipation if he is taking the Norco  for pain.  The patient should follow-up with Dr. Rolle within 1 week.  He should return to the emergency department if there is worsening pain not controlled with Norco, fever, vomiting not controlled with Zofran, worse in any way at all.  All patient questions were answered he will be discharged in good condition.  The patient's extremities urine.  If the passes a stone, he should place the specimen cup and take that with him to Dr. Rolle's office.                                         Medical Decision Making      Final diagnoses:   None       ED Disposition  ED Disposition       None            No follow-up provider specified.       Medication List      No changes were made to your prescriptions during this visit.            Alverto Billings MD  12/28/23 7205

## 2023-12-28 NOTE — ED NOTES
"Pt states pain \"is much better and then the wave hits me again\". Pain oscillated between 5-10   "

## 2023-12-29 NOTE — DISCHARGE INSTRUCTIONS
Take the Flomax as prescribed.  Zofran as needed as directed for vomiting.  Norco as needed as directed for pain.  Take Colace as needed as directed for constipation if you are taking the Norco for pain.  Strain your urine.  Passed a stone, place it in a specimen cup and take that with you to Dr. Rolle's office.  Call Dr. Rolle on Tuesday for a follow-up appointment within 1 week.  To the emergency department if there is worsening pain, fever, vomiting October the Zofran, pain not controlled with the Norco, worse in any way at all.